# Patient Record
(demographics unavailable — no encounter records)

---

## 2025-04-30 NOTE — ASSESSMENT
[FreeTextEntry1] : Type 2 DM  History of fungal infection in the genital area  Morbid obesity Will benifit form GLP1 agonist   Counseled on risk for MTC, pancreatitis, worsening gall bladder stones and worsening DR Medication to stop 2 weeks before planned surgery.     We agreed on the following plan today. Continue Metformin 1000 bid  Start Ozempic 0.25 for 4 weeks then 0.5 for another 4 weeks   Check sugars once a day. Low carb diet and exercise Please see an eye doctor Please see a foot doctor if you have tingling or numbness Please see our CDE     HLD Not on statin  Check LDL   HTN Bp acceptable  On Ace inh/ARB   I spent 45 minutes discussing with patient face to face and non face to face reviewing documentations, labs, and/or imaging, also discussing the management plans.   RTC in 3 months for 30 min

## 2025-04-30 NOTE — HISTORY OF PRESENT ILLNESS
[FreeTextEntry1] : Here for type 2 DM   History of DM: Diagnosed 20 years ago  Severity: uncontrolled  Home regimen: Metformin 1000 BID    Previous medications None    Sugar checks: none  Hypoglycemia: unaware  Eye doctor: last eye, no DR Neuropathy: none  Kidney disease: none    Smoking: none  Exercise: none    Labs: A1c 10.2   All other ROS reviewed, and they are negative.   Labs reviewed.